# Patient Record
Sex: FEMALE | Race: WHITE | Employment: FULL TIME | URBAN - METROPOLITAN AREA
[De-identification: names, ages, dates, MRNs, and addresses within clinical notes are randomized per-mention and may not be internally consistent; named-entity substitution may affect disease eponyms.]

---

## 2017-04-24 ENCOUNTER — ALLSCRIPTS OFFICE VISIT (OUTPATIENT)
Dept: OTHER | Facility: OTHER | Age: 32
End: 2017-04-24

## 2017-06-22 ENCOUNTER — ALLSCRIPTS OFFICE VISIT (OUTPATIENT)
Dept: OTHER | Facility: OTHER | Age: 32
End: 2017-06-22

## 2017-07-07 ENCOUNTER — ALLSCRIPTS OFFICE VISIT (OUTPATIENT)
Dept: OTHER | Facility: OTHER | Age: 32
End: 2017-07-07

## 2017-07-25 ENCOUNTER — ALLSCRIPTS OFFICE VISIT (OUTPATIENT)
Dept: OTHER | Facility: OTHER | Age: 32
End: 2017-07-25

## 2017-09-21 ENCOUNTER — APPOINTMENT (EMERGENCY)
Dept: RADIOLOGY | Facility: HOSPITAL | Age: 32
End: 2017-09-21
Payer: COMMERCIAL

## 2017-09-21 ENCOUNTER — HOSPITAL ENCOUNTER (EMERGENCY)
Facility: HOSPITAL | Age: 32
Discharge: HOME | End: 2017-09-21
Attending: EMERGENCY MEDICINE | Admitting: EMERGENCY MEDICINE
Payer: COMMERCIAL

## 2017-09-21 VITALS
DIASTOLIC BLOOD PRESSURE: 86 MMHG | SYSTOLIC BLOOD PRESSURE: 167 MMHG | RESPIRATION RATE: 17 BRPM | BODY MASS INDEX: 41.54 KG/M2 | OXYGEN SATURATION: 98 % | TEMPERATURE: 98.1 F | HEIGHT: 61 IN | WEIGHT: 220 LBS | HEART RATE: 98 BPM

## 2017-09-21 DIAGNOSIS — M25.569 KNEE PAIN: ICD-10-CM

## 2017-09-21 DIAGNOSIS — M25.579 ANKLE PAIN: ICD-10-CM

## 2017-09-21 DIAGNOSIS — T14.8XXA CONTUSION: ICD-10-CM

## 2017-09-21 DIAGNOSIS — T14.8XXA MUSCLE STRAIN: Primary | ICD-10-CM

## 2017-09-21 LAB — EXT PREG TEST URINE: NEGATIVE

## 2017-09-21 PROCEDURE — 73610 X-RAY EXAM OF ANKLE: CPT

## 2017-09-21 PROCEDURE — 81025 URINE PREGNANCY TEST: CPT | Performed by: EMERGENCY MEDICINE

## 2017-09-21 PROCEDURE — 99284 EMERGENCY DEPT VISIT MOD MDM: CPT

## 2017-09-21 PROCEDURE — 73564 X-RAY EXAM KNEE 4 OR MORE: CPT | Performed by: EMERGENCY MEDICINE

## 2017-09-21 RX ORDER — ACETAMINOPHEN 325 MG/1
975 TABLET ORAL ONCE
Status: COMPLETED | OUTPATIENT
Start: 2017-09-21 | End: 2017-09-21

## 2017-09-21 RX ORDER — IBUPROFEN 600 MG/1
600 TABLET ORAL ONCE
Status: COMPLETED | OUTPATIENT
Start: 2017-09-21 | End: 2017-09-21

## 2017-09-21 RX ORDER — NAPROXEN 500 MG/1
500 TABLET ORAL 2 TIMES DAILY WITH MEALS
Qty: 14 TABLET | Refills: 0 | Status: SHIPPED | OUTPATIENT
Start: 2017-09-21

## 2017-09-21 RX ADMIN — ACETAMINOPHEN 975 MG: 325 TABLET, FILM COATED ORAL at 23:09

## 2017-09-21 RX ADMIN — IBUPROFEN 600 MG: 600 TABLET, FILM COATED ORAL at 23:09

## 2017-09-25 ENCOUNTER — ALLSCRIPTS OFFICE VISIT (OUTPATIENT)
Dept: OTHER | Facility: OTHER | Age: 32
End: 2017-09-25
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
## 2017-10-16 ENCOUNTER — TRANSCRIBE ORDERS (OUTPATIENT)
Dept: ADMINISTRATIVE | Facility: HOSPITAL | Age: 32
End: 2017-10-16

## 2017-10-16 ENCOUNTER — ALLSCRIPTS OFFICE VISIT (OUTPATIENT)
Dept: OTHER | Facility: OTHER | Age: 32
End: 2017-10-16

## 2017-10-16 DIAGNOSIS — M25.562 LEFT KNEE PAIN, UNSPECIFIED CHRONICITY: Primary | ICD-10-CM

## 2017-10-16 DIAGNOSIS — M22.42 CHONDROMALACIA PATELLAE OF LEFT KNEE: ICD-10-CM

## 2017-10-16 DIAGNOSIS — M25.562 PAIN IN LEFT KNEE: ICD-10-CM
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
## 2017-10-20 ENCOUNTER — APPOINTMENT (OUTPATIENT)
Dept: LAB | Age: 32
End: 2017-10-20
Payer: COMMERCIAL

## 2017-10-20 ENCOUNTER — TRANSCRIBE ORDERS (OUTPATIENT)
Dept: ADMINISTRATIVE | Age: 32
End: 2017-10-20

## 2017-10-20 DIAGNOSIS — M25.562 PAIN IN LEFT KNEE: ICD-10-CM

## 2017-10-20 PROCEDURE — 36415 COLL VENOUS BLD VENIPUNCTURE: CPT

## 2017-10-20 PROCEDURE — 84703 CHORIONIC GONADOTROPIN ASSAY: CPT

## 2017-10-21 ENCOUNTER — HOSPITAL ENCOUNTER (OUTPATIENT)
Dept: RADIOLOGY | Facility: HOSPITAL | Age: 32
Discharge: HOME | End: 2017-10-21
Attending: ORTHOPAEDIC SURGERY
Payer: COMMERCIAL

## 2017-10-21 DIAGNOSIS — M25.562 PAIN IN LEFT KNEE: ICD-10-CM

## 2017-10-21 LAB — HCG SERPL QL: NEGATIVE

## 2017-10-21 PROCEDURE — 73721 MRI JNT OF LWR EXTRE W/O DYE: CPT

## 2017-11-06 ENCOUNTER — ALLSCRIPTS OFFICE VISIT (OUTPATIENT)
Dept: OTHER | Facility: OTHER | Age: 32
End: 2017-11-06

## 2017-11-27 ENCOUNTER — ALLSCRIPTS OFFICE VISIT (OUTPATIENT)
Dept: OTHER | Facility: OTHER | Age: 32
End: 2017-11-27

## 2018-01-04 DIAGNOSIS — Z00.00 ENCOUNTER FOR GENERAL ADULT MEDICAL EXAMINATION WITHOUT ABNORMAL FINDINGS: ICD-10-CM

## 2018-01-11 NOTE — PROGRESS NOTES
Assessment   1  Exudative pharyngitis (462) (J02 9)    Plan   Exudative pharyngitis    · Start: Azithromycin 250 MG Oral Tablet; TAKE 2 TABLETS ON DAY 1 THEN TAKE 1    TABLET A DAY FOR 4 DAYS  Flu vaccine need    · Temporarily Stop: Fluzone Quadrivalent Intramuscular Suspension    Chief Complaint   Pt here for sore throat, sinus pressure, ear ache, body ache and body chills  Pt states symptoms come and go  Pt states symptoms are worst at night and in the morning  History of Present Illness   HPI: feeling unwell for week or so worse ppast 4 days   laryngitis cough, productive  sore throat  starting new job in one week      Review of Systems        Constitutional: feeling poorly  ENT: sore throat-- and-- hoarseness  Cardiovascular: no complaints of slow or fast heart rate, no chest pain, no palpitations, no leg claudication or lower extremity edema  Respiratory: cough  Genitourinary: no complaints of dysuria, no incontinence, no pelvic pain, no dysmenorrhea, no vaginal discharge or abnormal vaginal bleeding  Musculoskeletal: no complaints of arthralgia, no myalgia, no joint swelling or stiffness, no limb pain or swelling  Integumentary: no complaints of skin rash or lesion, no itching or dry skin, no skin wounds  Neurological: no complaints of headache, no confusion, no numbness or tingling, no dizziness or fainting  Active Problems   1  Acute pain of left knee (719 46) (M25 562)  2  Bug bite, initial encounter (919 4) (W57 XXXA)  3  Exudative pharyngitis (462) (J02 9)  4  Flu vaccine need (V04 81) (Z23)  5  Patellofemoral chondrosis of left knee (717 7) (M22 42)  6  PPD screening test (V74 1) (Z11 1)  7  Screening for depression (V79 0) (Z13 89)    Past Medical History   1  History of PCOS (polycystic ovarian syndrome) (256 4) (E28 2)  Active Problems And Past Medical History Reviewed: The active problems and past medical history were reviewed and updated today  Family History   Mother   1  No pertinent family history  Family History   2  No pertinent family history    Social History    · Daily caffeine consumption   · Denied: History of Exercises regularly   · Denied: History of domestic violence   · Never a smoker   · Remote social alcohol use  The social history was reviewed and updated today  The social history was reviewed and is unchanged  Surgical History   1  History of Abdominal Surgery  2  History of Foot Surgery  3  History of Incision And Drainage Of Pilonidal Cyst  Surgical History Reviewed: The surgical history was reviewed and updated today  Current Meds   1  Concerta 27 MG Oral Tablet Extended Release Recorded     The medication list was reviewed and updated today  Allergies   1  Vicodin TABS    Vitals    Recorded: 39YAG8695 08:34AM   Temperature 97 9 F, Temporal   Heart Rate 80   Respiration 16   Systolic 162, Sitting   Diastolic 90, Sitting   Height 5 ft    Weight 235 lb    BMI Calculated 45 9   BSA Calculated 2     Physical Exam        Constitutional      General appearance: No acute distress, well appearing and well nourished  Ears, Nose, Mouth, and Throat      Otoscopic examination: Tympanic membranes translucent with normal light reflex  Canals patent without erythema  Oropharynx: Normal with no erythema, edema, exudate or lesions  Pulmonary      Auscultation of lungs: Clear to auscultation  Cardiovascular      Auscultation of heart: Normal rate and rhythm, normal S1 and S2, without murmurs  Lymphatic      Palpation of lymph nodes in neck: No lymphadenopathy  Musculoskeletal      Gait and station: Normal        Skin      Skin and subcutaneous tissue: Normal without rashes or lesions         Psychiatric      Orientation to person, place, and time: Normal           Signatures    Electronically signed by : Roger Lares DO; Galileo 10 2018  8:32AM EST                       (Author)

## 2018-01-12 VITALS
DIASTOLIC BLOOD PRESSURE: 96 MMHG | TEMPERATURE: 98.4 F | SYSTOLIC BLOOD PRESSURE: 144 MMHG | WEIGHT: 226 LBS | HEIGHT: 60 IN | OXYGEN SATURATION: 98 % | BODY MASS INDEX: 44.37 KG/M2 | HEART RATE: 74 BPM | RESPIRATION RATE: 18 BRPM

## 2018-01-13 VITALS
SYSTOLIC BLOOD PRESSURE: 123 MMHG | DIASTOLIC BLOOD PRESSURE: 84 MMHG | HEART RATE: 71 BPM | WEIGHT: 231.25 LBS | BODY MASS INDEX: 43.69 KG/M2

## 2018-01-13 VITALS
BODY MASS INDEX: 44.22 KG/M2 | RESPIRATION RATE: 18 BRPM | SYSTOLIC BLOOD PRESSURE: 124 MMHG | WEIGHT: 225.25 LBS | HEART RATE: 75 BPM | TEMPERATURE: 97.8 F | OXYGEN SATURATION: 98 % | HEIGHT: 60 IN | DIASTOLIC BLOOD PRESSURE: 92 MMHG

## 2018-01-13 VITALS
HEART RATE: 84 BPM | WEIGHT: 223.4 LBS | TEMPERATURE: 98.3 F | HEIGHT: 60 IN | DIASTOLIC BLOOD PRESSURE: 90 MMHG | SYSTOLIC BLOOD PRESSURE: 122 MMHG | RESPIRATION RATE: 16 BRPM | BODY MASS INDEX: 43.86 KG/M2

## 2018-01-14 VITALS
RESPIRATION RATE: 16 BRPM | BODY MASS INDEX: 45.43 KG/M2 | WEIGHT: 231.38 LBS | DIASTOLIC BLOOD PRESSURE: 82 MMHG | HEART RATE: 80 BPM | SYSTOLIC BLOOD PRESSURE: 118 MMHG | TEMPERATURE: 97.4 F | HEIGHT: 60 IN

## 2018-01-14 VITALS
SYSTOLIC BLOOD PRESSURE: 122 MMHG | HEIGHT: 60 IN | DIASTOLIC BLOOD PRESSURE: 90 MMHG | HEART RATE: 80 BPM | TEMPERATURE: 97.9 F | WEIGHT: 235 LBS | RESPIRATION RATE: 16 BRPM | BODY MASS INDEX: 46.13 KG/M2

## 2018-01-14 VITALS
WEIGHT: 224.2 LBS | BODY MASS INDEX: 44.02 KG/M2 | HEART RATE: 79 BPM | RESPIRATION RATE: 18 BRPM | SYSTOLIC BLOOD PRESSURE: 120 MMHG | DIASTOLIC BLOOD PRESSURE: 80 MMHG | HEIGHT: 60 IN | TEMPERATURE: 97.7 F

## 2018-01-16 NOTE — CONSULTS
Therapy  Rehabilitation Services Referral:   Patient Status: acute  Diagnosis: Left knee patellofemoral syndrome  Rehabilitation Services: evaluate and treat patient as needed and initiate a home exercise program  Modalities: modalities per therapist  Exercise/Treatment: AROM/PROM, stretching, knee, eccentric, stabilization and strengthening/PRE  Precautions/Comments: patellofemoral program    Frequency: 1-3 times per week, for 8 weeks  Please send progress report  I hereby certify that the services indicated above are medically necessary        Signatures   Electronically signed by : TRACEY Choe ; Nov 6 2017  3:36PM EST                       (Author)

## 2018-01-16 NOTE — MISCELLANEOUS
Message  Return to work or school:   Shadi Meneses is under my professional care   She was seen in my office on 09/25/2017   She is able to return to work on  09/25/2017            Signatures   Electronically signed by : Jordan Shell DO; Sep 26 2017  1:41PM EST                       (Author)

## 2018-01-22 VITALS
SYSTOLIC BLOOD PRESSURE: 133 MMHG | WEIGHT: 237 LBS | DIASTOLIC BLOOD PRESSURE: 91 MMHG | BODY MASS INDEX: 46.53 KG/M2 | HEIGHT: 60 IN | HEART RATE: 91 BPM

## 2018-08-30 ENCOUNTER — RESULT REVIEW (OUTPATIENT)
Age: 33
End: 2018-08-30

## 2019-06-14 PROBLEM — Z00.00 ENCOUNTER FOR PREVENTIVE HEALTH EXAMINATION: Status: ACTIVE | Noted: 2019-06-14

## 2019-06-17 ENCOUNTER — APPOINTMENT (OUTPATIENT)
Dept: HUMAN REPRODUCTION | Facility: CLINIC | Age: 34
End: 2019-06-17
Payer: COMMERCIAL

## 2019-06-17 DIAGNOSIS — E03.9 HYPOTHYROIDISM, UNSPECIFIED: ICD-10-CM

## 2019-06-17 PROCEDURE — 99205 OFFICE O/P NEW HI 60 MIN: CPT | Mod: 25

## 2019-06-17 PROCEDURE — 76830 TRANSVAGINAL US NON-OB: CPT

## 2019-06-17 PROCEDURE — 36415 COLL VENOUS BLD VENIPUNCTURE: CPT

## 2019-06-25 ENCOUNTER — APPOINTMENT (OUTPATIENT)
Dept: RADIOLOGY | Facility: HOSPITAL | Age: 34
End: 2019-06-25

## 2019-06-25 ENCOUNTER — OUTPATIENT (OUTPATIENT)
Dept: OUTPATIENT SERVICES | Facility: HOSPITAL | Age: 34
LOS: 1 days | End: 2019-06-25
Payer: COMMERCIAL

## 2019-06-25 ENCOUNTER — APPOINTMENT (OUTPATIENT)
Dept: HUMAN REPRODUCTION | Facility: CLINIC | Age: 34
End: 2019-06-25
Payer: COMMERCIAL

## 2019-06-25 DIAGNOSIS — N97.1 FEMALE INFERTILITY OF TUBAL ORIGIN: ICD-10-CM

## 2019-06-25 PROCEDURE — 36415 COLL VENOUS BLD VENIPUNCTURE: CPT

## 2019-06-25 PROCEDURE — 58340 CATHETER FOR HYSTEROGRAPHY: CPT

## 2019-06-25 PROCEDURE — 58340 CATHETER FOR HYSTEROGRAPHY: CPT | Mod: 59

## 2019-06-25 PROCEDURE — 74740 X-RAY FEMALE GENITAL TRACT: CPT

## 2019-06-25 PROCEDURE — 99214 OFFICE O/P EST MOD 30 MIN: CPT | Mod: 25

## 2019-07-01 ENCOUNTER — TRANSCRIPTION ENCOUNTER (OUTPATIENT)
Age: 34
End: 2019-07-01

## 2019-07-12 ENCOUNTER — APPOINTMENT (OUTPATIENT)
Dept: MRI IMAGING | Facility: CLINIC | Age: 34
End: 2019-07-12
Payer: COMMERCIAL

## 2019-07-12 ENCOUNTER — OUTPATIENT (OUTPATIENT)
Dept: OUTPATIENT SERVICES | Facility: HOSPITAL | Age: 34
LOS: 1 days | End: 2019-07-12
Payer: COMMERCIAL

## 2019-07-12 DIAGNOSIS — Z00.8 ENCOUNTER FOR OTHER GENERAL EXAMINATION: ICD-10-CM

## 2019-07-12 PROCEDURE — A9585: CPT

## 2019-07-12 PROCEDURE — 70553 MRI BRAIN STEM W/O & W/DYE: CPT

## 2019-07-12 PROCEDURE — 70553 MRI BRAIN STEM W/O & W/DYE: CPT | Mod: 26

## 2019-07-16 DIAGNOSIS — E22.1 HYPERPROLACTINEMIA: ICD-10-CM

## 2019-07-16 RX ORDER — CABERGOLINE 0.5 MG/1
0.5 TABLET ORAL
Qty: 12 | Refills: 3 | Status: ACTIVE | COMMUNITY
Start: 2019-07-16 | End: 1900-01-01

## 2019-07-23 ENCOUNTER — APPOINTMENT (OUTPATIENT)
Dept: HUMAN REPRODUCTION | Facility: CLINIC | Age: 34
End: 2019-07-23
Payer: COMMERCIAL

## 2019-07-23 PROCEDURE — 99215 OFFICE O/P EST HI 40 MIN: CPT

## 2019-08-27 ENCOUNTER — APPOINTMENT (OUTPATIENT)
Dept: NEUROSURGERY | Facility: CLINIC | Age: 34
End: 2019-08-27
Payer: COMMERCIAL

## 2019-08-27 VITALS
SYSTOLIC BLOOD PRESSURE: 118 MMHG | RESPIRATION RATE: 18 BRPM | BODY MASS INDEX: 47.2 KG/M2 | HEIGHT: 61 IN | HEART RATE: 73 BPM | DIASTOLIC BLOOD PRESSURE: 76 MMHG | WEIGHT: 250 LBS

## 2019-08-27 PROCEDURE — 99203 OFFICE O/P NEW LOW 30 MIN: CPT

## 2019-08-30 ENCOUNTER — APPOINTMENT (OUTPATIENT)
Dept: HUMAN REPRODUCTION | Facility: CLINIC | Age: 34
End: 2019-08-30
Payer: COMMERCIAL

## 2019-08-30 PROCEDURE — 99213 OFFICE O/P EST LOW 20 MIN: CPT

## 2019-09-05 NOTE — HISTORY OF PRESENT ILLNESS
[Unknown] : unknown [FreeTextEntry1] : new finding during fertility work up - Pituitary lesion [de-identified] : Mrs. Leonardo is a 35 yo woman with PMH of infertility, hypothyroid, and pilonidal cyst who was referred by Dr. Luther.  During endocrine work up for fertility and elevated prolactin levels, an MRI revealed a left pituitary microadenoma 0.9 x 0.7 x 0.5 cm.  She is feeling well, with no complaints.  Neuroophthalmology consult with Dr. De La Cruz was completed with no deficits as per patient.  Endocrine placed on Cabergoline 0.5 mg 1/2 tab BID.

## 2019-09-05 NOTE — ASSESSMENT
[FreeTextEntry1] : Mrs. Leonardo has a new finding of a pituitary microadenoma.  She will continue medical management with Endocrinology, and we will repeat an MRI in 6 months to evaluate the tumor activity.  We discussed the slight chance of the tumor increasing in size if off Cabergoline in the event she does become pregnant.  \par \par 1)  MRI Brain / Pituitary w/wo - 6 months\par 2)  RTO 6 months

## 2019-09-05 NOTE — PHYSICAL EXAM
[General Appearance - Alert] : alert [General Appearance - In No Acute Distress] : in no acute distress [General Appearance - Well Nourished] : well nourished [Oriented To Time, Place, And Person] : oriented to person, place, and time [General Appearance - Well Developed] : well developed [Affect] : the affect was normal [Mood] : the mood was normal [Impaired Insight] : insight and judgment were intact [Cranial Nerves Optic (II)] : visual acuity intact bilaterally,  pupils equal round and reactive to light [Cranial Nerves Oculomotor (III)] : extraocular motion intact [Cranial Nerves Trigeminal (V)] : facial sensation intact symmetrically [Cranial Nerves Facial (VII)] : face symmetrical [Cranial Nerves Vestibulocochlear (VIII)] : hearing was intact bilaterally [Cranial Nerves Hypoglossal (XII)] : there was no tongue deviation with protrusion [Cranial Nerves Accessory (XI - Cranial And Spinal)] : head turning and shoulder shrug symmetric [Cranial Nerves Glossopharyngeal (IX)] : tongue and palate midline [Motor Strength] : muscle strength was normal in all four extremities [Sensation Tactile Decrease] : light touch was intact [Normal] : normal [Able to heel walk] : the patient was able to heel walk [Able to toe walk] : the patient was able to toe walk [Intact] : all motor groups within normal limits of strength and tone bilaterally [PERRL With Normal Accommodation] : pupils were equal in size, round, reactive to light, with normal accommodation [Hearing Threshold Finger Rub Not [place]] : hearing was normal [Respiration, Rhythm And Depth] : normal respiratory rhythm and effort [Neck Appearance] : the appearance of the neck was normal [] : no respiratory distress [Auscultation Breath Sounds / Voice Sounds] : lungs were clear to auscultation bilaterally [Heart Sounds] : normal S1 and S2 [Edema] : there was no peripheral edema [Abnormal Walk] : normal gait [Involuntary Movements] : no involuntary movements were seen [Motor Tone] : muscle strength and tone were normal [Skin Color & Pigmentation] : normal skin color and pigmentation [Romberg's Sign] : Romberg's sign was negtive [Limited Balance] : balance was intact [Tremor] : no tremor present [FreeTextEntry1] : MMT 5/5 x 4, full strength, no focal weakness, deficits

## 2019-09-05 NOTE — PHYSICAL EXAM
[General Appearance - Alert] : alert [General Appearance - In No Acute Distress] : in no acute distress [General Appearance - Well Nourished] : well nourished [General Appearance - Well Developed] : well developed [Oriented To Time, Place, And Person] : oriented to person, place, and time [Impaired Insight] : insight and judgment were intact [Mood] : the mood was normal [Affect] : the affect was normal [Cranial Nerves Optic (II)] : visual acuity intact bilaterally,  pupils equal round and reactive to light [Cranial Nerves Oculomotor (III)] : extraocular motion intact [Cranial Nerves Trigeminal (V)] : facial sensation intact symmetrically [Cranial Nerves Facial (VII)] : face symmetrical [Cranial Nerves Vestibulocochlear (VIII)] : hearing was intact bilaterally [Cranial Nerves Accessory (XI - Cranial And Spinal)] : head turning and shoulder shrug symmetric [Cranial Nerves Glossopharyngeal (IX)] : tongue and palate midline [Cranial Nerves Hypoglossal (XII)] : there was no tongue deviation with protrusion [Motor Strength] : muscle strength was normal in all four extremities [Sensation Tactile Decrease] : light touch was intact [Normal] : normal [Able to heel walk] : the patient was able to heel walk [Able to toe walk] : the patient was able to toe walk [Intact] : all sensory within normal limits bilaterally [Hearing Threshold Finger Rub Not [place]] : hearing was normal [PERRL With Normal Accommodation] : pupils were equal in size, round, reactive to light, with normal accommodation [] : no respiratory distress [Neck Appearance] : the appearance of the neck was normal [Respiration, Rhythm And Depth] : normal respiratory rhythm and effort [Heart Sounds] : normal S1 and S2 [Auscultation Breath Sounds / Voice Sounds] : lungs were clear to auscultation bilaterally [Edema] : there was no peripheral edema [Abnormal Walk] : normal gait [Involuntary Movements] : no involuntary movements were seen [Motor Tone] : muscle strength and tone were normal [Skin Color & Pigmentation] : normal skin color and pigmentation [Romberg's Sign] : Romberg's sign was negtive [Limited Balance] : balance was intact [Tremor] : no tremor present [FreeTextEntry1] : MMT 5/5 x 4, full strength, no focal weakness, deficits

## 2019-09-05 NOTE — HISTORY OF PRESENT ILLNESS
[Unknown] : unknown [FreeTextEntry1] : new finding during fertility work up - Pituitary lesion [de-identified] : Mrs. Leonardo is a 35 yo woman with PMH of infertility, hypothyroid, and pilonidal cyst who was referred by Dr. Luther.  During endocrine work up for fertility and elevated prolactin levels, an MRI revealed a left pituitary microadenoma 0.9 x 0.7 x 0.5 cm.  She is feeling well, with no complaints.  Neuroophthalmology consult with Dr. De La Cruz was completed with no deficits as per patient.  Endocrine placed on Cabergoline 0.5 mg 1/2 tab BID.

## 2019-09-05 NOTE — REASON FOR VISIT
[New Patient Visit] : a new patient visit [Referred By: _________] : Patient was referred by [facility] [FreeTextEntry1] : new finding Pituitary lesion

## 2019-09-05 NOTE — REASON FOR VISIT
[Referred By: _________] : Patient was referred by [facility] [New Patient Visit] : a new patient visit [FreeTextEntry1] : new finding Pituitary lesion

## 2019-10-08 ENCOUNTER — APPOINTMENT (OUTPATIENT)
Dept: ULTRASOUND IMAGING | Facility: CLINIC | Age: 34
End: 2019-10-08
Payer: COMMERCIAL

## 2019-10-08 ENCOUNTER — OUTPATIENT (OUTPATIENT)
Dept: OUTPATIENT SERVICES | Facility: HOSPITAL | Age: 34
LOS: 1 days | End: 2019-10-08
Payer: COMMERCIAL

## 2019-10-08 DIAGNOSIS — Z00.8 ENCOUNTER FOR OTHER GENERAL EXAMINATION: ICD-10-CM

## 2019-10-08 PROCEDURE — 76536 US EXAM OF HEAD AND NECK: CPT | Mod: 26

## 2019-10-08 PROCEDURE — 76536 US EXAM OF HEAD AND NECK: CPT

## 2020-02-19 ENCOUNTER — FORM ENCOUNTER (OUTPATIENT)
Age: 35
End: 2020-02-19

## 2020-02-20 ENCOUNTER — APPOINTMENT (OUTPATIENT)
Dept: MRI IMAGING | Facility: CLINIC | Age: 35
End: 2020-02-20
Payer: COMMERCIAL

## 2020-02-20 ENCOUNTER — OUTPATIENT (OUTPATIENT)
Dept: OUTPATIENT SERVICES | Facility: HOSPITAL | Age: 35
LOS: 1 days | End: 2020-02-20
Payer: COMMERCIAL

## 2020-02-20 DIAGNOSIS — Z00.8 ENCOUNTER FOR OTHER GENERAL EXAMINATION: ICD-10-CM

## 2020-02-20 PROCEDURE — 70553 MRI BRAIN STEM W/O & W/DYE: CPT

## 2020-02-20 PROCEDURE — A9585: CPT

## 2020-02-20 PROCEDURE — 70553 MRI BRAIN STEM W/O & W/DYE: CPT | Mod: 26

## 2020-03-03 ENCOUNTER — APPOINTMENT (OUTPATIENT)
Dept: NEUROSURGERY | Facility: CLINIC | Age: 35
End: 2020-03-03
Payer: COMMERCIAL

## 2020-03-03 VITALS
HEIGHT: 61 IN | WEIGHT: 250 LBS | HEART RATE: 72 BPM | BODY MASS INDEX: 47.2 KG/M2 | OXYGEN SATURATION: 97 % | TEMPERATURE: 97 F | RESPIRATION RATE: 18 BRPM

## 2020-03-03 PROCEDURE — 99213 OFFICE O/P EST LOW 20 MIN: CPT

## 2020-03-13 NOTE — ASSESSMENT
[FreeTextEntry1] : Repeat MRI in 1 year for surveillance.\par \par 1)  MRI Brain w/wo - 1 year\par 2)  RTO 1 year

## 2020-03-13 NOTE — HISTORY OF PRESENT ILLNESS
[Unknown] : unknown [FreeTextEntry1] : 6 month f/u Pituitary lesion [de-identified] : Mrs. Leonardo is a 35 yo woman with PMH of infertility, hypothyroid, and pilonidal cyst, during fertility work up and elevated prolactin levels, an MRI revealed a left pituitary microadenoma 0.9 x 0.7 x 0.5 cm.  She is feeling well, with no complaints at this time.  Neuroophthalmology consult with Dr. De La Cruz was completed with no deficits as per patient.  Endocrine placed on Cabergoline 0.5 mg 1/2 tab BID which she reports intermittent headaches while taking.

## 2020-03-13 NOTE — PHYSICAL EXAM
[General Appearance - Alert] : alert [General Appearance - In No Acute Distress] : in no acute distress [General Appearance - Well Nourished] : well nourished [Oriented To Time, Place, And Person] : oriented to person, place, and time [General Appearance - Well Developed] : well developed [Impaired Insight] : insight and judgment were intact [Affect] : the affect was normal [Cranial Nerves Optic (II)] : visual acuity intact bilaterally,  pupils equal round and reactive to light [Mood] : the mood was normal [Cranial Nerves Oculomotor (III)] : extraocular motion intact [Cranial Nerves Trigeminal (V)] : facial sensation intact symmetrically [Cranial Nerves Vestibulocochlear (VIII)] : hearing was intact bilaterally [Cranial Nerves Facial (VII)] : face symmetrical [Cranial Nerves Glossopharyngeal (IX)] : tongue and palate midline [Cranial Nerves Accessory (XI - Cranial And Spinal)] : head turning and shoulder shrug symmetric [Cranial Nerves Hypoglossal (XII)] : there was no tongue deviation with protrusion [Motor Strength] : muscle strength was normal in all four extremities [Sensation Tactile Decrease] : light touch was intact [Normal] : normal [Able to toe walk] : the patient was able to toe walk [Able to heel walk] : the patient was able to heel walk [Intact] : all sensory within normal limits bilaterally [PERRL With Normal Accommodation] : pupils were equal in size, round, reactive to light, with normal accommodation [Hearing Threshold Finger Rub Not [place]] : hearing was normal [] : no respiratory distress [Neck Appearance] : the appearance of the neck was normal [Respiration, Rhythm And Depth] : normal respiratory rhythm and effort [Auscultation Breath Sounds / Voice Sounds] : lungs were clear to auscultation bilaterally [Heart Sounds] : normal S1 and S2 [Edema] : there was no peripheral edema [Abnormal Walk] : normal gait [Involuntary Movements] : no involuntary movements were seen [Motor Tone] : muscle strength and tone were normal [Skin Color & Pigmentation] : normal skin color and pigmentation [Romberg's Sign] : Romberg's sign was negtive [Limited Balance] : balance was intact [Tremor] : no tremor present [FreeTextEntry1] : MMT 5/5 x 4, full strength, no focal weakness, deficits

## 2020-04-01 ENCOUNTER — RESULT REVIEW (OUTPATIENT)
Age: 35
End: 2020-04-01

## 2020-09-24 ENCOUNTER — TRANSCRIPTION ENCOUNTER (OUTPATIENT)
Age: 35
End: 2020-09-24

## 2021-02-12 ENCOUNTER — OUTPATIENT (OUTPATIENT)
Dept: OUTPATIENT SERVICES | Facility: HOSPITAL | Age: 36
LOS: 1 days | End: 2021-02-12
Payer: COMMERCIAL

## 2021-02-12 ENCOUNTER — APPOINTMENT (OUTPATIENT)
Dept: ULTRASOUND IMAGING | Facility: CLINIC | Age: 36
End: 2021-02-12
Payer: COMMERCIAL

## 2021-02-12 DIAGNOSIS — Z00.8 ENCOUNTER FOR OTHER GENERAL EXAMINATION: ICD-10-CM

## 2021-02-12 PROCEDURE — 76536 US EXAM OF HEAD AND NECK: CPT

## 2021-02-12 PROCEDURE — 76536 US EXAM OF HEAD AND NECK: CPT | Mod: 26

## 2021-03-09 ENCOUNTER — APPOINTMENT (OUTPATIENT)
Dept: NEUROSURGERY | Facility: CLINIC | Age: 36
End: 2021-03-09
Payer: COMMERCIAL

## 2021-03-14 ENCOUNTER — OUTPATIENT (OUTPATIENT)
Dept: OUTPATIENT SERVICES | Facility: HOSPITAL | Age: 36
LOS: 1 days | End: 2021-03-14
Payer: COMMERCIAL

## 2021-03-14 ENCOUNTER — APPOINTMENT (OUTPATIENT)
Dept: MRI IMAGING | Facility: CLINIC | Age: 36
End: 2021-03-14
Payer: COMMERCIAL

## 2021-03-14 DIAGNOSIS — Z00.8 ENCOUNTER FOR OTHER GENERAL EXAMINATION: ICD-10-CM

## 2021-03-14 PROCEDURE — 70553 MRI BRAIN STEM W/O & W/DYE: CPT | Mod: 26

## 2021-03-14 PROCEDURE — 70553 MRI BRAIN STEM W/O & W/DYE: CPT

## 2021-03-14 PROCEDURE — A9585: CPT

## 2021-03-30 ENCOUNTER — APPOINTMENT (OUTPATIENT)
Dept: NEUROSURGERY | Facility: CLINIC | Age: 36
End: 2021-03-30
Payer: COMMERCIAL

## 2021-03-30 VITALS
BODY MASS INDEX: 47.2 KG/M2 | HEIGHT: 61 IN | WEIGHT: 250 LBS | SYSTOLIC BLOOD PRESSURE: 119 MMHG | HEART RATE: 68 BPM | DIASTOLIC BLOOD PRESSURE: 78 MMHG

## 2021-03-30 VITALS — TEMPERATURE: 97.7 F

## 2021-03-30 PROCEDURE — 99072 ADDL SUPL MATRL&STAF TM PHE: CPT

## 2021-03-30 PROCEDURE — 99213 OFFICE O/P EST LOW 20 MIN: CPT

## 2021-03-30 RX ORDER — LEVOTHYROXINE SODIUM 0.09 MG/1
88 TABLET ORAL DAILY
Refills: 0 | Status: ACTIVE | COMMUNITY

## 2021-04-05 NOTE — ASSESSMENT
[FreeTextEntry1] : 34 yo female with BMI of 47.24 whose MRI shows a stable pituitary microadenoma.  She is asymptomatic, and followed by Endocrinology on Cabergoline.  There are no contraindications to IVF therapy, however, there is a risk of the pituitary adenoma increasing in size if Cabergoline is discontinued which will be discussed with Endocrinology as well.\par \par 1)  MRI Pituitary w/wo - 1 year\par 2)  RTO 1 year

## 2021-04-05 NOTE — RESULTS/DATA
[FreeTextEntry1] : [facility]\par    [facility] [place] An [facility] of [facility] Department of Radiology\par   Radiology Report\par \par \par Patient Name: ALONZO LEGGETT   Report Date: 15-Mar-2021 15:44.00 \par Patient ID: [id number] ([id number]), [id number] ([id number])  Accession No.: [id number] \par Patient Birth Date: 1985  Report Status: F \par Referring Physician: [id number] MANIATIS TANISHA GARCIA   Reason For Study:  \par \par EXAM: MR BRAIN WAW IC FOR SRS\par \par PROCEDURE DATE: 03/14/2021\par \par INTERPRETATION: Contrast-enhanced MRI of the brain.\par \par CLINICAL INDICATION: Follow-up pituitary adenoma\par \par TECHNIQUE: Multiplanar, multisequence MR images of the brain were obtained before and after the dynamic intravenous administration of 10 cc of Gadavist. 0 cc were discarded. Special attention was paid to the sellar and suprasellar regions.\par \par COMPARISON: MRI brain 2/20/2020\par \par FINDINGS:\par \par Similar-appearing 6 x 7 x 5 mm (maximal anterior-posterior by transverse by craniocaudad dimensions) focus of relative hypoenhancement within the central pituitary gland involving both the neurohypophysis and adenohypophysis, likely representing a pituitary adenoma.\par \par Infundibulum is midline. No optic chiasm compression. No sellar floor depression. No cavernous sinus mass.\par \par No hydrocephalus, midline shift, mass effect, acute cranial hemorrhage, or acute infarction. No focal parenchymal signal abnormality.\par \par Signal voids are seen within the major intracranial vessels consistent with their patency.\par \par No abnormal parenchymal or leptomeningeal enhancement.\par \par The visualized paranasal sinuses and mastoid air cells are clear.\par \par The orbits and craniocervical junction are unremarkable.\par \par IMPRESSION:\par \par No significant interval change from 2/20/2020.\par \par Similar-appearing 6 x 7 x 5 mm focus of relative hypoenhancement within the central pituitary gland involving both the neurohypophysis and adenohypophysis, likely representing a pituitary adenoma.\par \par JUMA GO MD; Attending Radiologist\par This document has been electronically signed. Mar 15 2021 3:44PM

## 2021-04-05 NOTE — REASON FOR VISIT
[FreeTextEntry1] : 1 year f/u non surgical pituitary microadenoma\par \par 3/14/2021 MRI Brain w/wo - PACS\par similar size/ full report below

## 2021-04-05 NOTE — HISTORY OF PRESENT ILLNESS
[FreeTextEntry1] : Mrs. Leonardo is a 36 yo woman with PMH of infertility, hypothyroid, and pilonidal cyst, during fertility work up and elevated prolactin levels, an MRI revealed a left pituitary microadenoma 0.9 x 0.7 x 0.5 cm in 2/2020.  She is feeling well, and remains asymptomatic, with no complaints at this time.  Neuroophthalmology consult with Dr. De La Cruz was completed with no deficits as per patient.  Endocrine managing on Cabergoline 0.5 mg 1/2 tab BID.  \par \par 3/14/2021 MRI Brain w/wo - PACS/ [facility] for review

## 2021-04-05 NOTE — PHYSICAL EXAM
[General Appearance - Alert] : alert [General Appearance - In No Acute Distress] : in no acute distress [Oriented To Time, Place, And Person] : oriented to person, place, and time [Impaired Insight] : insight and judgment were intact [Motor Strength] : muscle strength was normal in all four extremities [Sensation Tactile Decrease] : light touch was intact [Normal] : normal [Able to toe walk] : the patient was able to toe walk [Able to heel walk] : the patient was able to heel walk [Sclera] : the sclera and conjunctiva were normal [PERRL With Normal Accommodation] : pupils were equal in size, round, reactive to light, with normal accommodation [Hearing Threshold Finger Rub Not Latimer] : hearing was normal [Neck Appearance] : the appearance of the neck was normal [] : no respiratory distress [Respiration, Rhythm And Depth] : normal respiratory rhythm and effort [Edema] : there was no peripheral edema [Abnormal Walk] : normal gait [Involuntary Movements] : no involuntary movements were seen [Motor Tone] : muscle strength and tone were normal [Skin Color & Pigmentation] : normal skin color and pigmentation [Romberg's Sign] : Romberg's sign was negtive [Limited Balance] : balance was intact [Tremor] : no tremor present [FreeTextEntry1] : MMT 5/5 x 4, full strength, no focal weakness, deficits

## 2021-04-12 ENCOUNTER — RESULT REVIEW (OUTPATIENT)
Age: 36
End: 2021-04-12

## 2021-08-25 ENCOUNTER — APPOINTMENT (OUTPATIENT)
Dept: HUMAN REPRODUCTION | Facility: CLINIC | Age: 36
End: 2021-08-25
Payer: COMMERCIAL

## 2021-08-25 PROCEDURE — 99215 OFFICE O/P EST HI 40 MIN: CPT | Mod: 95

## 2022-04-08 ENCOUNTER — OUTPATIENT (OUTPATIENT)
Dept: OUTPATIENT SERVICES | Facility: HOSPITAL | Age: 37
LOS: 1 days | End: 2022-04-08
Payer: COMMERCIAL

## 2022-04-08 ENCOUNTER — APPOINTMENT (OUTPATIENT)
Dept: MRI IMAGING | Facility: CLINIC | Age: 37
End: 2022-04-08
Payer: COMMERCIAL

## 2022-04-08 DIAGNOSIS — D35.2 BENIGN NEOPLASM OF PITUITARY GLAND: ICD-10-CM

## 2022-04-08 PROCEDURE — 70553 MRI BRAIN STEM W/O & W/DYE: CPT

## 2022-04-08 PROCEDURE — 70553 MRI BRAIN STEM W/O & W/DYE: CPT | Mod: 26

## 2022-04-08 PROCEDURE — A9585: CPT

## 2022-04-11 ENCOUNTER — APPOINTMENT (OUTPATIENT)
Dept: ULTRASOUND IMAGING | Facility: CLINIC | Age: 37
End: 2022-04-11
Payer: COMMERCIAL

## 2022-04-11 ENCOUNTER — OUTPATIENT (OUTPATIENT)
Dept: OUTPATIENT SERVICES | Facility: HOSPITAL | Age: 37
LOS: 1 days | End: 2022-04-11
Payer: COMMERCIAL

## 2022-04-11 DIAGNOSIS — Z00.8 ENCOUNTER FOR OTHER GENERAL EXAMINATION: ICD-10-CM

## 2022-04-11 PROCEDURE — 76536 US EXAM OF HEAD AND NECK: CPT

## 2022-04-11 PROCEDURE — 76536 US EXAM OF HEAD AND NECK: CPT | Mod: 26

## 2022-04-12 ENCOUNTER — APPOINTMENT (OUTPATIENT)
Dept: NEUROSURGERY | Facility: CLINIC | Age: 37
End: 2022-04-12
Payer: COMMERCIAL

## 2022-04-12 VITALS
SYSTOLIC BLOOD PRESSURE: 117 MMHG | HEART RATE: 80 BPM | BODY MASS INDEX: 47.2 KG/M2 | HEIGHT: 61 IN | DIASTOLIC BLOOD PRESSURE: 79 MMHG | OXYGEN SATURATION: 95 % | WEIGHT: 250 LBS

## 2022-04-12 DIAGNOSIS — D35.2 BENIGN NEOPLASM OF PITUITARY GLAND: ICD-10-CM

## 2022-04-12 PROCEDURE — 99213 OFFICE O/P EST LOW 20 MIN: CPT

## 2022-04-12 RX ORDER — DOXYCYCLINE HYCLATE 100 MG/1
100 TABLET ORAL
Qty: 8 | Refills: 0 | Status: DISCONTINUED | COMMUNITY
Start: 2019-06-17 | End: 2022-04-12

## 2022-04-12 RX ORDER — LEVOTHYROXINE SODIUM 0.05 MG/1
50 TABLET ORAL
Qty: 30 | Refills: 5 | Status: DISCONTINUED | COMMUNITY
Start: 2019-06-17 | End: 2022-04-12

## 2022-04-12 RX ORDER — CEFPROZIL 500 MG/1
500 TABLET ORAL
Qty: 14 | Refills: 0 | Status: DISCONTINUED | COMMUNITY
Start: 2019-08-20 | End: 2022-04-12

## 2022-04-13 PROBLEM — D35.2 PITUITARY MICROADENOMA: Status: ACTIVE | Noted: 2019-08-27

## 2022-04-23 NOTE — ASSESSMENT
[FreeTextEntry1] : 4/8/22 MRI shows slight decrease in size of known pituitary microadenoma with no optic chiasm compression.  She will continue medical management with Endocrine on Cabergoline.  I will repeat an MRI in 1 year for surveillance.\par \par 1)  MRI Pituitary w/wo - 1 year\par 2)  RTO 1 year

## 2022-04-23 NOTE — REASON FOR VISIT
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

## 2022-04-23 NOTE — HISTORY OF PRESENT ILLNESS
[FreeTextEntry1] : Mrs. Leonardo is a 36 yo woman with PMH of infertility, hypothyroid, and pilonidal cyst, during fertility work up and elevated prolactin levels, an MRI revealed a left pituitary microadenoma 0.9 x 0.7 x 0.5 cm in 2/2020.  She is feeling well, and remains asymptomatic, with no complaints at this time.  Neuroophthalmology consult with Dr. De La Cruz was completed with no deficits as per patient.  Endocrine managing continues on Cabergoline.  \par \par 4/8/22 MRI Pituitary w/wo - PACS/ [facility] for review  \par \par 3/14/2021 MRI Brain w/wo - PACS/ [facility] for review

## 2022-04-23 NOTE — PHYSICAL EXAM
[General Appearance - Alert] : alert [General Appearance - In No Acute Distress] : in no acute distress [Oriented To Time, Place, And Person] : oriented to person, place, and time [Impaired Insight] : insight and judgment were intact [Motor Strength] : muscle strength was normal in all four extremities [Sensation Tactile Decrease] : light touch was intact [Normal] : normal [Able to toe walk] : the patient was able to toe walk [Able to heel walk] : the patient was able to heel walk [Sclera] : the sclera and conjunctiva were normal [Hearing Threshold Finger Rub Not [place]] : hearing was normal [Neck Appearance] : the appearance of the neck was normal [] : no respiratory distress [Respiration, Rhythm And Depth] : normal respiratory rhythm and effort [Edema] : there was no peripheral edema [Abnormal Walk] : normal gait [Involuntary Movements] : no involuntary movements were seen [Motor Tone] : muscle strength and tone were normal [Skin Color & Pigmentation] : normal skin color and pigmentation [Romberg's Sign] : Romberg's sign was negtive [Limited Balance] : balance was intact [Tremor] : no tremor present [FreeTextEntry1] : MMT 5/5 x 4, full strength, no focal weakness ordeficits

## 2022-06-22 ENCOUNTER — RESULT REVIEW (OUTPATIENT)
Age: 37
End: 2022-06-22

## 2023-04-18 ENCOUNTER — APPOINTMENT (OUTPATIENT)
Dept: NEUROSURGERY | Facility: CLINIC | Age: 38
End: 2023-04-18

## 2023-09-27 ENCOUNTER — APPOINTMENT (OUTPATIENT)
Dept: MRI IMAGING | Facility: CLINIC | Age: 38
End: 2023-09-27
Payer: COMMERCIAL

## 2023-09-27 ENCOUNTER — OUTPATIENT (OUTPATIENT)
Dept: OUTPATIENT SERVICES | Facility: HOSPITAL | Age: 38
LOS: 1 days | End: 2023-09-27
Payer: COMMERCIAL

## 2023-09-27 DIAGNOSIS — Z00.00 ENCOUNTER FOR GENERAL ADULT MEDICAL EXAMINATION WITHOUT ABNORMAL FINDINGS: ICD-10-CM

## 2023-09-27 PROCEDURE — 73721 MRI JNT OF LWR EXTRE W/O DYE: CPT | Mod: 26,LT

## 2023-09-27 PROCEDURE — 73721 MRI JNT OF LWR EXTRE W/O DYE: CPT

## 2023-12-07 ENCOUNTER — OFFICE (OUTPATIENT)
Dept: URBAN - METROPOLITAN AREA CLINIC 90 | Facility: CLINIC | Age: 38
Setting detail: OPHTHALMOLOGY
End: 2023-12-07
Payer: COMMERCIAL

## 2023-12-07 DIAGNOSIS — H40.013: ICD-10-CM

## 2023-12-07 DIAGNOSIS — D35.2: ICD-10-CM

## 2023-12-07 DIAGNOSIS — H16.222: ICD-10-CM

## 2023-12-07 DIAGNOSIS — H52.7: ICD-10-CM

## 2023-12-07 DIAGNOSIS — H52.13: ICD-10-CM

## 2023-12-07 DIAGNOSIS — Q12.0: ICD-10-CM

## 2023-12-07 PROCEDURE — 92015 DETERMINE REFRACTIVE STATE: CPT | Performed by: OPHTHALMOLOGY

## 2023-12-07 PROCEDURE — 92014 COMPRE OPH EXAM EST PT 1/>: CPT | Performed by: OPHTHALMOLOGY

## 2023-12-07 PROCEDURE — 92083 EXTENDED VISUAL FIELD XM: CPT | Performed by: OPHTHALMOLOGY

## 2023-12-07 ASSESSMENT — SUPERFICIAL PUNCTATE KERATITIS (SPK): OS_SPK: T

## 2023-12-07 ASSESSMENT — CONFRONTATIONAL VISUAL FIELD TEST (CVF)
OS_FINDINGS: FULL
OD_FINDINGS: FULL

## 2023-12-09 ASSESSMENT — REFRACTION_AUTOREFRACTION
OD_CYLINDER: -0.50
OS_CYLINDER: -0.50
OS_SPHERE: -0.75
OD_AXIS: 106
OS_AXIS: 024
OD_SPHERE: -1.00

## 2023-12-09 ASSESSMENT — REFRACTION_MANIFEST
OS_AXIS: 025
OD_VA1: 20/20
OD_CYLINDER: -0.50
OD_SPHERE: -1.50
OD_AXIS: 105
OD_SPHERE: -1.75
OD_VA1: 20/20
OS_CYLINDER: -0.75
OS_SPHERE: -1.25
OD_AXIS: 130
OD_VA1: 20/20
OS_SPHERE: -1.50
OS_AXIS: 025
OS_SPHERE: -1.00
OS_CYLINDER: -0.75
OS_VA1: 20/20
OD_SPHERE: -1.25
OS_CYLINDER: -0.25
OS_VA1: 20/20
OD_AXIS: 105
OS_VA1: 20/20
OD_CYLINDER: -0.25
OD_CYLINDER: -0.50
OS_AXIS: 030

## 2023-12-09 ASSESSMENT — REFRACTION_CURRENTRX
OS_AXIS: 032
OD_OVR_VA: 20/
OD_CYLINDER: -0.25
OS_SPHERE: -1.50
OD_SPHERE: -1.75
OD_VPRISM_DIRECTION: SV
OD_VPRISM_DIRECTION: SV
OS_AXIS: 032
OD_AXIS: 136
OD_AXIS: 136
OS_CYLINDER: -0.25
OS_VPRISM_DIRECTION: SV
OS_CYLINDER: -0.25
OD_CYLINDER: -0.25
OS_OVR_VA: 20/
OS_OVR_VA: 20/
OD_OVR_VA: 20/
OS_SPHERE: -1.00
OS_VPRISM_DIRECTION: SV
OD_SPHERE: -1.75

## 2023-12-09 ASSESSMENT — SPHEQUIV_DERIVED
OD_SPHEQUIV: -1.25
OS_SPHEQUIV: -1.625
OS_SPHEQUIV: -1
OD_SPHEQUIV: -1.75
OS_SPHEQUIV: -1.375
OD_SPHEQUIV: -1.5
OD_SPHEQUIV: -1.875
OS_SPHEQUIV: -1.625